# Patient Record
Sex: FEMALE | Race: OTHER | Employment: FULL TIME | URBAN - METROPOLITAN AREA
[De-identification: names, ages, dates, MRNs, and addresses within clinical notes are randomized per-mention and may not be internally consistent; named-entity substitution may affect disease eponyms.]

---

## 2021-07-06 ENCOUNTER — OFFICE VISIT (OUTPATIENT)
Dept: FAMILY MEDICINE CLINIC | Facility: CLINIC | Age: 41
End: 2021-07-06
Payer: COMMERCIAL

## 2021-07-06 VITALS
WEIGHT: 182.5 LBS | HEIGHT: 65 IN | DIASTOLIC BLOOD PRESSURE: 60 MMHG | BODY MASS INDEX: 30.41 KG/M2 | RESPIRATION RATE: 17 BRPM | SYSTOLIC BLOOD PRESSURE: 100 MMHG | HEART RATE: 56 BPM

## 2021-07-06 DIAGNOSIS — N64.4 MASTALGIA IN FEMALE: ICD-10-CM

## 2021-07-06 DIAGNOSIS — Z13.0 SCREENING FOR DEFICIENCY ANEMIA: ICD-10-CM

## 2021-07-06 DIAGNOSIS — Z13.220 LIPID SCREENING: ICD-10-CM

## 2021-07-06 DIAGNOSIS — Z00.00 WELLNESS EXAMINATION: Primary | ICD-10-CM

## 2021-07-06 DIAGNOSIS — Z98.82 HISTORY OF BREAST AUGMENTATION: ICD-10-CM

## 2021-07-06 DIAGNOSIS — Z12.31 ENCOUNTER FOR SCREENING MAMMOGRAM FOR MALIGNANT NEOPLASM OF BREAST: ICD-10-CM

## 2021-07-06 DIAGNOSIS — Z12.4 CERVICAL CANCER SCREENING: ICD-10-CM

## 2021-07-06 PROCEDURE — 3008F BODY MASS INDEX DOCD: CPT | Performed by: FAMILY MEDICINE

## 2021-07-06 PROCEDURE — 3074F SYST BP LT 130 MM HG: CPT | Performed by: FAMILY MEDICINE

## 2021-07-06 PROCEDURE — 99386 PREV VISIT NEW AGE 40-64: CPT | Performed by: FAMILY MEDICINE

## 2021-07-06 PROCEDURE — 3078F DIAST BP <80 MM HG: CPT | Performed by: FAMILY MEDICINE

## 2021-07-07 LAB — HPV I/H RISK 1 DNA SPEC QL NAA+PROBE: NEGATIVE

## 2021-07-13 ENCOUNTER — TELEPHONE (OUTPATIENT)
Dept: FAMILY MEDICINE CLINIC | Facility: CLINIC | Age: 41
End: 2021-07-13

## 2021-07-13 NOTE — TELEPHONE ENCOUNTER
----- Message from Wen Kapadia MD sent at 7/13/2021  8:07 AM CDT -----  Results reviewed.  Please inform patient, patient should repeat test in 5 years

## 2021-07-19 ENCOUNTER — TELEPHONE (OUTPATIENT)
Dept: FAMILY MEDICINE CLINIC | Facility: CLINIC | Age: 41
End: 2021-07-19

## 2021-07-19 NOTE — TELEPHONE ENCOUNTER
Received patient blood work, please inform patient that there is no evidence of anemia, glucose is normal, liver function and renal function is normal, her cholesterol is slightly elevated, need diet modification decrease saturated fats as butter, margarin

## 2021-07-19 NOTE — TELEPHONE ENCOUNTER
With  [facility] ID #[id number]  Advised patient of 's notes below. Patient verbalized understanding. Patient had lab work done at [facility] and would like copy of results mailed to her. Unable to view results on [facility].      Onsite

## 2021-07-21 ENCOUNTER — HOSPITAL ENCOUNTER (OUTPATIENT)
Dept: MAMMOGRAPHY | Facility: HOSPITAL | Age: 41
Discharge: HOME | End: 2021-07-21
Attending: FAMILY MEDICINE
Payer: COMMERCIAL

## 2021-07-21 DIAGNOSIS — Z12.31 ENCOUNTER FOR SCREENING MAMMOGRAM FOR MALIGNANT NEOPLASM OF BREAST: ICD-10-CM

## 2021-07-21 DIAGNOSIS — N64.4 MASTALGIA IN FEMALE: ICD-10-CM

## 2021-07-21 PROCEDURE — 77062 BREAST TOMOSYNTHESIS BI: CPT | Performed by: FAMILY MEDICINE

## 2021-07-21 PROCEDURE — 77066 DX MAMMO INCL CAD BI: CPT | Performed by: FAMILY MEDICINE

## 2021-12-02 ENCOUNTER — TELEPHONE (OUTPATIENT)
Dept: SURGERY | Facility: CLINIC | Age: 41
End: 2021-12-02

## 2022-09-14 NOTE — PROGRESS NOTES
HPI:   Marlon Lee is a 39year old female who presents for an Annual Health Visit.    Patient is here to establish care, patient reports that she had breast augmentation that was done in 2011 and required new expiration in 2015, she states that sh Left voicemail asking patient to return call to office for results Physical Exam  Vitals and nursing note reviewed. Constitutional:       Appearance: She is well-developed. Cardiovascular:      Rate and Rhythm: Normal rate and regular rhythm. Heart sounds: Normal heart sounds.    Pulmonary:      Effort: [facility]      Provided referral for mammogram to be completed at this time, also provide referral for evaluation with plastic surgery as patient is having significant discomfort with breast implant as also noted suture in the skin